# Patient Record
Sex: MALE | Race: OTHER | URBAN - METROPOLITAN AREA
[De-identification: names, ages, dates, MRNs, and addresses within clinical notes are randomized per-mention and may not be internally consistent; named-entity substitution may affect disease eponyms.]

---

## 2017-12-11 ENCOUNTER — HOSPITAL ENCOUNTER (EMERGENCY)
Age: 47
Discharge: HOME | End: 2017-12-11
Attending: EMERGENCY MEDICINE
Payer: SELF-PAY

## 2017-12-11 ENCOUNTER — APPOINTMENT (OUTPATIENT)
Dept: CT IMAGING | Age: 47
End: 2017-12-11
Attending: EMERGENCY MEDICINE
Payer: SELF-PAY

## 2017-12-11 ENCOUNTER — APPOINTMENT (OUTPATIENT)
Dept: ULTRASOUND IMAGING | Age: 47
End: 2017-12-11
Attending: EMERGENCY MEDICINE
Payer: SELF-PAY

## 2017-12-11 VITALS
BODY MASS INDEX: 30.91 KG/M2 | DIASTOLIC BLOOD PRESSURE: 77 MMHG | RESPIRATION RATE: 16 BRPM | OXYGEN SATURATION: 94 % | HEIGHT: 62 IN | WEIGHT: 168 LBS | SYSTOLIC BLOOD PRESSURE: 157 MMHG | HEART RATE: 80 BPM | TEMPERATURE: 98.2 F

## 2017-12-11 DIAGNOSIS — K80.00 CALCULUS OF GALLBLADDER WITH ACUTE CHOLECYSTITIS WITHOUT OBSTRUCTION: ICD-10-CM

## 2017-12-11 DIAGNOSIS — R10.11 ABDOMINAL PAIN, RIGHT UPPER QUADRANT: Primary | ICD-10-CM

## 2017-12-11 LAB
ALBUMIN SERPL-MCNC: 4.4 G/DL (ref 3.5–5)
ALBUMIN/GLOB SERPL: 1 {RATIO} (ref 1.2–3.5)
ALP SERPL-CCNC: 106 U/L (ref 50–136)
ALT SERPL-CCNC: 36 U/L (ref 12–65)
ANION GAP SERPL CALC-SCNC: 13 MMOL/L (ref 7–16)
AST SERPL-CCNC: 30 U/L (ref 15–37)
BASOPHILS # BLD: 0 K/UL (ref 0–0.2)
BASOPHILS NFR BLD: 0 % (ref 0–2)
BILIRUB SERPL-MCNC: 0.6 MG/DL (ref 0.2–1.1)
BUN SERPL-MCNC: 15 MG/DL (ref 6–23)
CALCIUM SERPL-MCNC: 9.6 MG/DL (ref 8.3–10.4)
CHLORIDE SERPL-SCNC: 102 MMOL/L (ref 98–107)
CO2 SERPL-SCNC: 25 MMOL/L (ref 21–32)
CREAT SERPL-MCNC: 1.13 MG/DL (ref 0.8–1.5)
CRP SERPL-MCNC: <0.2 MG/DL (ref 0–0.9)
DIFFERENTIAL METHOD BLD: ABNORMAL
EOSINOPHIL # BLD: 0 K/UL (ref 0–0.8)
EOSINOPHIL NFR BLD: 0 % (ref 0.5–7.8)
ERYTHROCYTE [DISTWIDTH] IN BLOOD BY AUTOMATED COUNT: 13.2 % (ref 11.9–14.6)
GLOBULIN SER CALC-MCNC: 4.3 G/DL (ref 2.3–3.5)
GLUCOSE SERPL-MCNC: 137 MG/DL (ref 65–100)
HCT VFR BLD AUTO: 40.9 % (ref 41.1–50.3)
HGB BLD-MCNC: 14.3 G/DL (ref 13.6–17.2)
IMM GRANULOCYTES # BLD: 0 K/UL (ref 0–0.5)
IMM GRANULOCYTES NFR BLD AUTO: 0 % (ref 0–5)
LIPASE SERPL-CCNC: 213 U/L (ref 73–393)
LYMPHOCYTES # BLD: 1.7 K/UL (ref 0.5–4.6)
LYMPHOCYTES NFR BLD: 10 % (ref 13–44)
MCH RBC QN AUTO: 29.1 PG (ref 26.1–32.9)
MCHC RBC AUTO-ENTMCNC: 35 G/DL (ref 31.4–35)
MCV RBC AUTO: 83.3 FL (ref 79.6–97.8)
MONOCYTES # BLD: 0.4 K/UL (ref 0.1–1.3)
MONOCYTES NFR BLD: 3 % (ref 4–12)
NEUTS SEG # BLD: 13.8 K/UL (ref 1.7–8.2)
NEUTS SEG NFR BLD: 87 % (ref 43–78)
PLATELET # BLD AUTO: 228 K/UL (ref 150–450)
PMV BLD AUTO: 10.1 FL (ref 10.8–14.1)
POTASSIUM SERPL-SCNC: 3.7 MMOL/L (ref 3.5–5.1)
PROT SERPL-MCNC: 8.7 G/DL (ref 6.3–8.2)
RBC # BLD AUTO: 4.91 M/UL (ref 4.23–5.67)
SODIUM SERPL-SCNC: 140 MMOL/L (ref 136–145)
WBC # BLD AUTO: 16 K/UL (ref 4.3–11.1)

## 2017-12-11 PROCEDURE — 86140 C-REACTIVE PROTEIN: CPT | Performed by: EMERGENCY MEDICINE

## 2017-12-11 PROCEDURE — 76705 ECHO EXAM OF ABDOMEN: CPT

## 2017-12-11 PROCEDURE — 74011000258 HC RX REV CODE- 258: Performed by: EMERGENCY MEDICINE

## 2017-12-11 PROCEDURE — 80053 COMPREHEN METABOLIC PANEL: CPT | Performed by: EMERGENCY MEDICINE

## 2017-12-11 PROCEDURE — 99283 EMERGENCY DEPT VISIT LOW MDM: CPT | Performed by: EMERGENCY MEDICINE

## 2017-12-11 PROCEDURE — 74011636320 HC RX REV CODE- 636/320: Performed by: EMERGENCY MEDICINE

## 2017-12-11 PROCEDURE — 83690 ASSAY OF LIPASE: CPT | Performed by: EMERGENCY MEDICINE

## 2017-12-11 PROCEDURE — 81003 URINALYSIS AUTO W/O SCOPE: CPT | Performed by: EMERGENCY MEDICINE

## 2017-12-11 PROCEDURE — 96374 THER/PROPH/DIAG INJ IV PUSH: CPT | Performed by: EMERGENCY MEDICINE

## 2017-12-11 PROCEDURE — 74011250636 HC RX REV CODE- 250/636: Performed by: EMERGENCY MEDICINE

## 2017-12-11 PROCEDURE — 96361 HYDRATE IV INFUSION ADD-ON: CPT | Performed by: EMERGENCY MEDICINE

## 2017-12-11 PROCEDURE — 74177 CT ABD & PELVIS W/CONTRAST: CPT

## 2017-12-11 PROCEDURE — 96375 TX/PRO/DX INJ NEW DRUG ADDON: CPT | Performed by: EMERGENCY MEDICINE

## 2017-12-11 PROCEDURE — 85025 COMPLETE CBC W/AUTO DIFF WBC: CPT | Performed by: EMERGENCY MEDICINE

## 2017-12-11 RX ORDER — HYDROCODONE BITARTRATE AND ACETAMINOPHEN 5; 325 MG/1; MG/1
1 TABLET ORAL
Qty: 11 TAB | Refills: 0 | Status: SHIPPED | OUTPATIENT
Start: 2017-12-11

## 2017-12-11 RX ORDER — ONDANSETRON 2 MG/ML
4 INJECTION INTRAMUSCULAR; INTRAVENOUS
Status: COMPLETED | OUTPATIENT
Start: 2017-12-11 | End: 2017-12-11

## 2017-12-11 RX ORDER — SODIUM CHLORIDE 0.9 % (FLUSH) 0.9 %
10 SYRINGE (ML) INJECTION
Status: COMPLETED | OUTPATIENT
Start: 2017-12-11 | End: 2017-12-11

## 2017-12-11 RX ORDER — KETOROLAC TROMETHAMINE 30 MG/ML
30 INJECTION, SOLUTION INTRAMUSCULAR; INTRAVENOUS
Status: COMPLETED | OUTPATIENT
Start: 2017-12-11 | End: 2017-12-11

## 2017-12-11 RX ORDER — ONDANSETRON 4 MG/1
4 TABLET, ORALLY DISINTEGRATING ORAL
Qty: 11 TAB | Refills: 1 | Status: SHIPPED | OUTPATIENT
Start: 2017-12-11

## 2017-12-11 RX ADMIN — Medication 10 ML: at 06:39

## 2017-12-11 RX ADMIN — KETOROLAC TROMETHAMINE 30 MG: 30 INJECTION, SOLUTION INTRAMUSCULAR at 06:29

## 2017-12-11 RX ADMIN — SODIUM CHLORIDE 100 ML: 900 INJECTION, SOLUTION INTRAVENOUS at 06:39

## 2017-12-11 RX ADMIN — IOPAMIDOL 100 ML: 755 INJECTION, SOLUTION INTRAVENOUS at 06:39

## 2017-12-11 RX ADMIN — SODIUM CHLORIDE 1000 ML: 900 INJECTION, SOLUTION INTRAVENOUS at 06:29

## 2017-12-11 RX ADMIN — ONDANSETRON 4 MG: 2 INJECTION INTRAMUSCULAR; INTRAVENOUS at 06:29

## 2017-12-11 NOTE — PROGRESS NOTES
present to cover any requests in the Emergency Room. Thank you for this referral,      Eric De Leon, [facility]  /Patient [address] [facility].  [address] [place] [address]    [phone]

## 2017-12-11 NOTE — PROGRESS NOTES
Visited with patient. Conversed via Chai Hopson . Patient states no PCP and no insurance. Patient notified that we will have Samson López, [employer] [place] , call him to assist with PCP and other needs.

## 2017-12-11 NOTE — DISCHARGE INSTRUCTIONS
CALL THE SURGEON TODAY for a follow up appointment         Yin Samuel de la colecistitis aguda - [ Learning About Acute Cholecystitis ]  ¿Qué es la colecistitis? La colecistitis es la inflamación de la vesícula biliar. La vesícula biliar almacena bilis. La bilis ayuda al organismo a [employer]. Normalmente, la bilis fluye de la vesícula biliar al intestino bean. Un cálculo biliar que se ha quedado atorado en el conducto cístico es la causa más frecuente de la colecistitis aguda. El conducto cístico es el tubo que transporta la bilis afuera de la vesícula. El cálculo biliar no permite que la bilis salga de la vesícula. El resultado es ann-marie vesícula irritada e inflamada. Esta enfermedad también puede ser causada por infección o trauma, tasha, por ejemplo, ann-marie lesión en un accidente de automóvil. La colecistitis debe ser tratada de inmediato. Es probable que usted tenga que ir al hospital. [place] es el tratamiento más común. ¿Cuáles son los síntomas? Los síntomas incluyen:  · Dolor mike y grave en la parte superior derecha del abdomen. Kianna es el síntoma más común. A veces, el dolor puede trasladarse a la espalda o al omóplato derecho. Puede durar más de 6 horas. · Náuseas o vómito. · Stephanie Haven. ¿Cómo se trata? Felicia Lopez principal de tratar esta enfermedad es por medio de cirugía para extraer la vesícula biliar. Con frecuencia, esta operación se puede realizar por medio de pequeños mcgregor (incisiones) en el abdomen. [place] se llama colecistectomía laparoscópica. En algunos casos, es posible que usted necesite ann-marie cirugía [place]. Usted podría necesitar la cirugía lo antes posible. El médico podría tratar de reducir la hinchazón y la irritación en la vesícula biliar antes de extraerla. Es posible que se le administren líquidos y antibióticos a través de ann-marie vena (vía intravenosa o IV). También se le podrían administrar analgésicos (medicamentos para el dolor).   [address] seguimiento es ann-marie parte clave de emmanuel tratamiento y seguridad. Asegúrese de hacer y acudir a todas las citas, y llame a emmanuel médico si está teniendo problemas. También es ann-marie buena idea saber los resultados de los exámenes y mantener ann-marie lista de los medicamentos que lindsey. ¿Dónde puede encontrar más información en inglés? [place] a http://sherry-sally.info/. Escriba T940 en la búsqueda para aprender más acerca de \"Aprender acerca de la colecistitis aguda - [ Learning About Acute Cholecystitis ]. \"  Revisado: 12 edwards, 2017  Versión del contenido: 11.4  © 2520-0239 [employer]. Las instrucciones de cuidado fueron adaptadas bajo licencia por [employer] (which disclaims liability or warranty for this information). Si usted tiene Glenn Scurry afección médica o sobre estas instrucciones, siempre pregunte a emmanuel profesional de eyad. Healthwise, Incorporated niega toda garantía o responsabilidad por emmanuel uso de esta información.

## 2017-12-11 NOTE — ED PROVIDER NOTES
HPI Comments: Patient presents complaining of a generalized abdominal pain that is nondescript. He states pain has been present constantly at the intensity of 10 over 10 since approximately 2200 hrs. Last night. He reports nausea but no vomiting he denies any fever or chills there is no radiation of the pain noted no increasing or decreasing factors are noted. He denies prior occurrence. Patient is a 50 y.o. male presenting with abdominal pain. The history is provided by the patient. The history is limited by a language barrier. A  was used. Abdominal Pain    This is a new problem. The current episode started 6 to 12 hours ago. The problem occurs constantly. The problem has not changed since onset. The pain is associated with an unknown factor. The pain is located in the generalized abdominal region. Quality: can't describe. The pain is at a severity of 10/10. The pain is severe. Associated symptoms include nausea. Pertinent negatives include no anorexia, no fever, no belching, no diarrhea, no flatus, no hematochezia, no melena, no vomiting, no constipation, no dysuria, no frequency, no hematuria, no headaches, no arthralgias, no myalgias, no trauma, no chest pain and no back pain. Nothing worsens the pain. The pain is relieved by nothing. Past workup comments: patient denies prior occurrence also denies any past medical history's and has not had any evaluations recently. . The patient's surgical history non-contributory. No past medical history on file. No past surgical history on file. No family history on file. Social History     Social History    Marital status: UNKNOWN     Spouse name: N/A    Number of children: N/A    Years of education: N/A     Occupational History    Not on file.      Social History Main Topics    Smoking status: Not on file    Smokeless tobacco: Not on file    Alcohol use Yes      Comment: drinks sometimes    Drug use: Not on file    Sexual activity: Not on file     Other Topics Concern    Not on file     Social History Narrative    No narrative on file         ALLERGIES: Review of patient's allergies indicates no known allergies. Review of Systems   Constitutional: Negative for fever. Cardiovascular: Negative for chest pain. Gastrointestinal: Positive for abdominal pain and nausea. Negative for anorexia, constipation, diarrhea, flatus, hematochezia, melena and vomiting. Genitourinary: Negative for dysuria, frequency and hematuria. Musculoskeletal: Negative for arthralgias, back pain and myalgias. Neurological: Negative for headaches. All other systems reviewed and are negative. Vitals:    12/11/17 0544   BP: (!) 207/89   Pulse: 80   Resp: 16   Temp: 98.2 °F (36.8 °C)   SpO2: 98%            Physical Exam   Constitutional: He is oriented to person, place, and time. He appears well-developed and well-nourished. No distress. HENT:   Head: Normocephalic and atraumatic. Mouth/Throat: No oropharyngeal exudate. Eyes: Conjunctivae and EOM are normal. Pupils are equal, round, and reactive to light. Neck: Normal range of motion. Neck supple. Cardiovascular: Normal rate, regular rhythm and normal heart sounds. Pulmonary/Chest: Effort normal and breath sounds normal.   Abdominal: Soft. Bowel sounds are normal. He exhibits no distension and no mass. There is no tenderness. There is no rebound and no guarding. No focal tenderness identified on abdominal examination   Musculoskeletal: Normal range of motion. He exhibits no edema. Neurological: He is alert and oriented to person, place, and time. Skin: Skin is warm and dry. Psychiatric: He has a normal mood and affect. His behavior is normal.   Nursing note and vitals reviewed.        MDM  Number of Diagnoses or Management Options     Amount and/or Complexity of Data Reviewed  Clinical lab tests: ordered and reviewed  Tests in the radiology section of CPT®: ordered and reviewed  Independent visualization of images, tracings, or specimens: yes    Risk of Complications, Morbidity, and/or Mortality  Presenting problems: moderate  Diagnostic procedures: moderate  Management options: moderate    Patient Progress  Patient progress: stable    ===================================================================  I have received Johann Darlington from Dr. Lion Valerio    We discussed the patient's complaint, presentation, vitals and differential diagnosis. We discussed the patient's current status, and response to treatments  We reviewed critical lab values, allergies, and other factors. Issues or problems that are still being evaluated are: abd pain, elevated WBC, enlarge GB on CT    We rounded at the patient's bedside, the patient and family's questions and concerns were addressed. EXAM- no pain @ present  Resting    Recent Results (from the past 8 hour(s))   CBC WITH AUTOMATED DIFF    Collection Time: 12/11/17  5:52 AM   Result Value Ref Range    WBC 16.0 (H) 4.3 - 11.1 K/uL    RBC 4.91 4.23 - 5.67 M/uL    HGB 14.3 13.6 - 17.2 g/dL    HCT 40.9 (L) 41.1 - 50.3 %    MCV 83.3 79.6 - 97.8 FL    MCH 29.1 26.1 - 32.9 PG    MCHC 35.0 31.4 - 35.0 g/dL    RDW 13.2 11.9 - 14.6 %    PLATELET 585 832 - 959 K/uL    MPV 10.1 (L) 10.8 - 14.1 FL    DF AUTOMATED      NEUTROPHILS 87 (H) 43 - 78 %    LYMPHOCYTES 10 (L) 13 - 44 %    MONOCYTES 3 (L) 4.0 - 12.0 %    EOSINOPHILS 0 (L) 0.5 - 7.8 %    BASOPHILS 0 0.0 - 2.0 %    IMMATURE GRANULOCYTES 0 0.0 - 5.0 %    ABS. NEUTROPHILS 13.8 (H) 1.7 - 8.2 K/UL    ABS. LYMPHOCYTES 1.7 0.5 - 4.6 K/UL    ABS. MONOCYTES 0.4 0.1 - 1.3 K/UL    ABS. EOSINOPHILS 0.0 0.0 - 0.8 K/UL    ABS. BASOPHILS 0.0 0.0 - 0.2 K/UL    ABS. IMM.  GRANS. 0.0 0.0 - 0.5 K/UL   METABOLIC PANEL, COMPREHENSIVE    Collection Time: 12/11/17  5:52 AM   Result Value Ref Range    Sodium 140 136 - 145 mmol/L    Potassium 3.7 3.5 - 5.1 mmol/L    Chloride 102 98 - 107 mmol/L    CO2 25 21 - 32 mmol/L Anion gap 13 7 - 16 mmol/L    Glucose 137 (H) 65 - 100 mg/dL    BUN 15 6 - 23 MG/DL    Creatinine 1.13 0.8 - 1.5 MG/DL    GFR est AA >60 >60 ml/min/1.73m2    GFR est non-AA >60 >60 ml/min/1.73m2    Calcium 9.6 8.3 - 10.4 MG/DL    Bilirubin, total 0.6 0.2 - 1.1 MG/DL    ALT (SGPT) 36 12 - 65 U/L    AST (SGOT) 30 15 - 37 U/L    Alk. phosphatase 106 50 - 136 U/L    Protein, total 8.7 (H) 6.3 - 8.2 g/dL    Albumin 4.4 3.5 - 5.0 g/dL    Globulin 4.3 (H) 2.3 - 3.5 g/dL    A-G Ratio 1.0 (L) 1.2 - 3.5     LIPASE    Collection Time: 12/11/17  5:52 AM   Result Value Ref Range    Lipase 213 73 - 393 U/L   C REACTIVE PROTEIN, QT    Collection Time: 12/11/17  5:52 AM   Result Value Ref Range    C-Reactive protein <0.2 0.0 - 0.9 mg/dL     Assessment/Plan- treat pain, obtain Lucretia Farrar MD; 12/11/2017 @8:09 AM  ===================================================================            ED Course   Comment By Time   US -- + for sludge and stones with mild GBW thickening. WBC elevated. Normal LFT and lipase. No pain at present.  Consult General Surgery Trevor Lopez MD 12/11 7889     D/w Dr. aCro Almendarez -- no fever, normal LFT, normal lipase,  Recheck -- abdomen is soft and nontender with rebound  No nause/vomiting  Will d/c home with pain and nausea meds  F/u in the office  Trevor Lopez MD; 12/11/2017 @9:51 AM===========================================      Procedures